# Patient Record
(demographics unavailable — no encounter records)

---

## 2025-04-21 NOTE — PHYSICAL EXAM
[Midline] : trachea located in midline position [Removed] : palatine tonsils previously removed [Laryngoscopy Performed] : laryngoscopy was performed, see procedure section for findings [Normal] : no rashes [FreeTextEntry1] : No hoarseness.  [de-identified] : No cervical lymphadenopathy palpable. [de-identified] : Right thyroid lobe enlarged, with 2 cm firm nodule in medial mid lobe.  Lef thyroid lobe fullness. [de-identified] : Carotid pulses 2+ bilateral.

## 2025-04-21 NOTE — CONSULT LETTER
[Dear  ___] : Dear  [unfilled], [( Thank you for referring [unfilled] for consultation for _____ )] : Thank you for referring [unfilled] for consultation for [unfilled] [Please see my note below.] : Please see my note below. [Consult Closing:] : Thank you very much for allowing me to participate in the care of this patient.  If you have any questions, please do not hesitate to contact me. [Sincerely,] : Sincerely, [FreeTextEntry2] : ARUN GO MD [id number] [place], [place] [id number]  [FreeTextEntry3] :  Re Gleason MD  Otolaryngology, Head and Neck Surgery

## 2025-04-21 NOTE — HISTORY OF PRESENT ILLNESS
[de-identified] : I was pleased to evaluate Mr. MIRIAN LUCAS, a 21-year-old man who was referred by Dr. GO for a thyroid nodule.   He was accompanied by his sister, who contributed to history.   Graves' disease was diagnosed in 2022 (tremors, unexplained weight loss) and he has been taking methimazole. Dose of methimazole reduced by half to 10 mg BID about 2 weeks ago. Enlargement was noted, and US thyroid was done in February. Denies neck pressure, difficulty breathing, difficulty swallowing or voice change.  Denies thyroid eye disease. Family history:  MGM s/p thyroidectomy for thyroid cancer; mother is hypothyroid. No history of radiation other than routine medical imaging.   He works as a  at [employer].   US THYROID (02-) at [facility] - Both lobes are diffusely heterogeneous. Vascularity within normal limits bilaterally. - ISTHMUS:     0.9 cm.  - RIGHT LOBE: 7.2 x 2.9 x 2.6 cm.   1.) 1.6 x 1.1 x 1.7 cm lobulated hypoechoic solid nodule with an echogenic rim and mild associated vascularity at anteromedial mid lobe at the junction with the isthmus. Punctate echogenic foci are present as well as in the surrounding tissue. TR 5.   2.) 1.2 x 1.0 x 1.2 cm smoothly marginated hypoechoic solid nodule in posterior mid lobe.  TR 4. - LEFT LOBE:   5.6 x 2.1 x 2.0 cm.      1.)  0.9 x 0.8 x 1.0 cm smoothly marginated heterogeneous isoechoic/hypoechoic solid nodule in posterior lower pole. TR 4

## 2025-04-21 NOTE — ASSESSMENT
[FreeTextEntry1] : Mr. LUCAS is a 21-year-old man who had Graves hyperthyroidism, diagnosed in 2022 and treated with methimazole. Recent US showed a 1.7 cm hypoechoic solid nodule with punctate echogenic foci in the medial right mid lobe.  Two other nodes were identified and need to be monitored. He has no thyroid eye disease or compressive symptoms.    I recommended USG FNA of right mid lobe thyroid nodule, 1.7 cm, to r/o neoplasm.  I cautioned him that methimazole can cause nuclear atypia and abnormal cytology results. Samples for cytology and for possible thyroid mutation genetic testing would be obtained during the biopsy. I referred him to Dr. Lentz for the USG FNA and for further management, to which he agreed.  He should expect a call from her office to schedule an appointment.

## 2025-04-29 NOTE — ASSESSMENT
[FreeTextEntry1] : 21M with MNG, grave's suspicious right sided nodule biopsied today - Will call with results - Likely needs total thyroidectomy - Pending final FNA/thyroseq   Thu Lentz MD

## 2025-04-29 NOTE — PROCEDURE
[FreeTextEntry3] : ULTRASOUND GUIDED FNA Site(s): Right Thyroid Midpole Nodule Indication: 1.7 Nodule   I discussed the risks, benefits and alternatives of fine needle aspiration biopsy. I explained the risks, including but not limited to bleeding, pain and infection. Written informed consent was obtained. The skin was prepped with alcohol and the skin was anesthetized with 1cc of 1% lidocaine w/ epinephrine 1: 100,000   An ultrasound was used to visualize the lesion. A 23G needle was passed and a slide prepared. The pathologist was on site for immediate evaluation. Additional needles were passed until adequate cellular specimen was obtained. Patient tolerated the procedure well and there were no complications.   Findings - US: right mid-pole nodule lobulated with multiple foci of calcifications 1.7x1.7x1.2cm Images saved on US machine - Pathology: Hypercellular, thyroseq collected Pending final result.

## 2025-04-29 NOTE — DATA REVIEWED
[de-identified] : US THYROID (02-) at [facility] - Both lobes are diffusely heterogeneous. Vascularity within normal limits bilaterally. - ISTHMUS: 0.9 cm. - RIGHT LOBE: 7.2 x 2.9 x 2.6 cm. 1.) 1.6 x 1.1 x 1.7 cm lobulated hypoechoic solid nodule with an echogenic rim and mild associated vascularity at anteromedial mid lobe at the junction with the isthmus. Punctate echogenic foci are present as well as in the surrounding tissue. TR 5. 2.) 1.2 x 1.0 x 1.2 cm smoothly marginated hypoechoic solid nodule in posterior mid lobe. TR 4. - LEFT LOBE: 5.6 x 2.1 x 2.0 cm. 1.) 0.9 x 0.8 x 1.0 cm smoothly marginated heterogeneous isoechoic/hypoechoic solid nodule in posterior lower pole. TR 4

## 2025-04-29 NOTE — HISTORY OF PRESENT ILLNESS
[de-identified] : Mr. MIRIAN LUCAS, a 21-year-old man who was referred by Dr. Gleason for a thyroid nodule.  Graves' disease was diagnosed in 2022 (tremors, unexplained weight loss) and he has been taking methimazole. Dose of methimazole reduced by half to 10 mg BID about early April 2025 Enlargement was noted, and US thyroid was done in February. Denies neck pressure, difficulty breathing, difficulty swallowing or voice change. Denies thyroid eye disease. Family history: MGM s/p thyroidectomy for thyroid cancer; mother is hypothyroid. No history of radiation other than routine medical imaging. He works as a  at [employer].

## 2025-04-29 NOTE — PHYSICAL EXAM
[TextEntry] :     GEN: Well nourished, A&Ox3, NAD, Voice: strong FACE: Normaocephalic, symmetric, no masses or deformities, parotids symmetric without masses EYES: EOMI, PERRLA, No periorbital edema or erythema EARS: No external deformity, EAC patent, TMs intact NOSE: No external deformity, anterior exam with normal inferior turbinates OC/OP: Oral cavity without masses or lesions, OP symmetric. Tonsils: NA Dentition: wnl NECK: Supple, Palpable right thyroid nodule NEURO: Cranial nerves grossly intact RESP: Easy work of breathing, symmetric chest rise PSYCH: Normal affect

## 2025-05-20 NOTE — PHYSICAL EXAM
[Alert] : alert [Well Nourished] : well nourished [No Acute Distress] : no acute distress [Well Developed] : well developed [EOMI] : extra ocular movement intact [Visual Fields Grossly Intact] : visual fields grossly intact [PERRL] : pupils equal, round and reactive to light [No Lid Lag] : no lid lag [Normal Oropharynx] : the oropharynx was normal [No Respiratory Distress] : no respiratory distress [No Accessory Muscle Use] : no accessory muscle use [Clear to Auscultation] : lungs were clear to auscultation bilaterally [Normal S1, S2] : normal S1 and S2 [Normal Rate] : heart rate was normal [Regular Rhythm] : with a regular rhythm [No Edema] : no peripheral edema [Pedal Pulses Normal] : the pedal pulses are present [Normal Bowel Sounds] : normal bowel sounds [Not Tender] : non-tender [Not Distended] : not distended [Soft] : abdomen soft [Normal Anterior Cervical Nodes] : no anterior cervical lymphadenopathy [Normal Posterior Cervical Nodes] : no posterior cervical lymphadenopathy [No Spinal Tenderness] : no spinal tenderness [Spine Straight] : spine straight [No Stigmata of Cushings Syndrome] : no stigmata of Cushings Syndrome [Normal Gait] : normal gait [Normal Strength/Tone] : muscle strength and tone were normal [No Rash] : no rash [Acanthosis Nigricans] : no acanthosis nigricans [Normal Reflexes] : deep tendon reflexes were 2+ and symmetric [No Tremors] : no tremors [Oriented x3] : oriented to person, place, and time [de-identified] : mild proptosis b/l w/ redness [de-identified] : palpable MNG

## 2025-05-20 NOTE — ASSESSMENT
[Methimazole Therapy/PTU Therapy] : Risks and benefits of methimazole therapy/PTU therapy were discussed with the patient, including rash, liver dysfunction, and agranulocytosis. Patient was instructed to call the office for flu-like symptoms (e.g. fever and sore-throat) [Levothyroxine] : The patient was instructed to take Levothyroxine on an empty stomach, separate from vitamins, and wait at least 30 minutes before eating [FreeTextEntry1] : Now appears euthyroid. WE discussed all options for reestablishing euthyroid state, including TT vs. SAHNI. vs. long term BUNDY use, and their respective r/b. After discussion, he is interested on definitive Rx w/ TT given interval diagnosis of PTC. agree w/ TT. Has script for SSKI 6 days prior to surgery. need for lifelong LT4 reviewed. alarm s/s of eye disease reviewed. start eye drops and Selenium given mild orbitopathy. advised on tobacco avoidance (pt is non smoker). Verbalized understanding and agrees with treatment plan, will contact MD and seek emergency medical care if condition changes, we reviewed the sign/symptoms or worsening thyrotoxicosis and thyroid storm.   f/u 2-4 weeks post op.

## 2025-05-20 NOTE — HISTORY OF PRESENT ILLNESS
[FreeTextEntry1] : 22 y/o M w/ Hx of graves disease here for initial evaluation and management of thyroid issues generally feels well and endorses no acute complaints. reports diagnosis 3 y/a after period of palpitations and weight loss. reports adherence to MTX ever since, presently taken adequately 20 mg daily. MNG noted on PE,  US THYROID (02-) at [facility] - Both lobes are diffusely heterogeneous. Vascularity within normal limits bilaterally. - ISTHMUS: 0.9 cm. - RIGHT LOBE: 7.2 x 2.9 x 2.6 cm. 1.) 1.6 x 1.1 x 1.7 cm lobulated hypoechoic solid nodule with an echogenic rim and mild associated vascularity at anteromedial mid lobe at the junction with the isthmus. Punctate echogenic foci are present as well as in the surrounding tissue. TR 5. 2.) 1.2 x 1.0 x 1.2 cm smoothly marginated hypoechoic solid nodule in posterior mid lobe. TR 4. - LEFT LOBE: 5.6 x 2.1 x 2.0 cm. 1.) 0.9 x 0.8 x 1.0 cm smoothly marginated heterogeneous isoechoic/hypoechoic solid nodule in posterior lower pole. TR 4 FNA confirmatory of [place] VI PTC. Pending TT on 6/5/2025 at [facility]. he otherwise denies any f/c, CP, SOB, palpitations, tremors, depressed mood, anxiety, palpitations, n/v, stool/urinary abn, skin/weight changes, heat/cold intolerance, HAs, breast/nipple changes, polyuria/polydipsia/nocturia or other complaints.